# Patient Record
Sex: MALE | Race: BLACK OR AFRICAN AMERICAN | NOT HISPANIC OR LATINO | Employment: FULL TIME | URBAN - METROPOLITAN AREA
[De-identification: names, ages, dates, MRNs, and addresses within clinical notes are randomized per-mention and may not be internally consistent; named-entity substitution may affect disease eponyms.]

---

## 2023-07-27 PROBLEM — Z86.39 HISTORY OF NUTRITIONAL DISORDER: Status: ACTIVE | Noted: 2023-07-27

## 2023-07-27 PROBLEM — R63.5 WEIGHT GAIN: Status: ACTIVE | Noted: 2023-07-27

## 2023-07-27 PROBLEM — N50.89 GENITAL LESION, MALE: Status: ACTIVE | Noted: 2023-07-27

## 2023-07-27 PROBLEM — R11.0 NAUSEA: Status: ACTIVE | Noted: 2023-07-27

## 2023-07-27 PROBLEM — R42 DIZZINESS: Status: ACTIVE | Noted: 2023-07-27

## 2023-07-27 PROBLEM — B96.81 HELICOBACTER PYLORI GASTRITIS: Status: ACTIVE | Noted: 2023-07-27

## 2023-07-27 PROBLEM — K29.70 GASTRITIS: Status: ACTIVE | Noted: 2023-07-27

## 2023-07-27 PROBLEM — F41.9 ANXIETY: Status: ACTIVE | Noted: 2023-07-27

## 2023-07-27 PROBLEM — K29.70 HELICOBACTER PYLORI GASTRITIS: Status: ACTIVE | Noted: 2023-07-27

## 2023-07-27 PROBLEM — R07.89 ATYPICAL CHEST PAIN: Status: ACTIVE | Noted: 2023-07-27

## 2023-07-27 PROBLEM — B96.89 SKIN INFECTION, BACTERIAL: Status: ACTIVE | Noted: 2023-07-27

## 2023-07-27 PROBLEM — M79.18 MUSCULOSKELETAL PAIN: Status: ACTIVE | Noted: 2023-07-27

## 2023-07-27 PROBLEM — K62.5 RECTAL BLEEDING: Status: ACTIVE | Noted: 2023-07-27

## 2023-07-27 PROBLEM — R53.83 FATIGUE: Status: ACTIVE | Noted: 2023-07-27

## 2023-07-27 PROBLEM — R51.9 HEAD ACHE: Status: ACTIVE | Noted: 2023-07-27

## 2023-07-27 PROBLEM — K21.9 ESOPHAGEAL REFLUX: Status: ACTIVE | Noted: 2023-07-27

## 2023-07-27 PROBLEM — R10.9 ABDOMINAL DISCOMFORT: Status: ACTIVE | Noted: 2023-07-27

## 2023-07-27 PROBLEM — L73.9 FOLLICULITIS: Status: ACTIVE | Noted: 2023-07-27

## 2023-07-27 PROBLEM — E55.9 VITAMIN D DEFICIENCY: Status: ACTIVE | Noted: 2023-07-27

## 2023-07-27 PROBLEM — R05.9 COUGH: Status: ACTIVE | Noted: 2023-07-27

## 2023-07-27 PROBLEM — L73.1 INGROWN HAIR: Status: ACTIVE | Noted: 2023-07-27

## 2023-07-27 PROBLEM — L08.9 SKIN INFECTION, BACTERIAL: Status: ACTIVE | Noted: 2023-07-27

## 2023-07-27 PROBLEM — J06.9 ACUTE UPPER RESPIRATORY INFECTION: Status: ACTIVE | Noted: 2023-07-27

## 2023-07-27 PROBLEM — L60.8 SUBUNGUAL HEMORRHAGE: Status: ACTIVE | Noted: 2023-07-27

## 2023-07-27 RX ORDER — CLINDAMYCIN PHOSPHATE AND BENZOYL PEROXIDE 10; 50 MG/G; MG/G
GEL TOPICAL 2 TIMES DAILY
COMMUNITY
Start: 2022-07-27 | End: 2023-07-31 | Stop reason: ALTCHOICE

## 2023-07-27 RX ORDER — LORATADINE 10 MG/1
1 TABLET ORAL DAILY
COMMUNITY
Start: 2015-11-08 | End: 2023-07-31 | Stop reason: ALTCHOICE

## 2023-07-27 RX ORDER — ERGOCALCIFEROL 1.25 MG/1
CAPSULE ORAL
COMMUNITY
Start: 2020-01-22 | End: 2023-07-31 | Stop reason: ALTCHOICE

## 2023-07-27 RX ORDER — ONDANSETRON 4 MG/1
TABLET, ORALLY DISINTEGRATING ORAL
COMMUNITY
Start: 2017-11-09 | End: 2023-07-31 | Stop reason: ALTCHOICE

## 2023-07-31 ENCOUNTER — OFFICE VISIT (OUTPATIENT)
Dept: PRIMARY CARE | Facility: CLINIC | Age: 28
End: 2023-07-31
Payer: COMMERCIAL

## 2023-07-31 VITALS
WEIGHT: 257.5 LBS | BODY MASS INDEX: 38.14 KG/M2 | OXYGEN SATURATION: 97 % | DIASTOLIC BLOOD PRESSURE: 82 MMHG | HEIGHT: 69 IN | SYSTOLIC BLOOD PRESSURE: 117 MMHG | HEART RATE: 78 BPM

## 2023-07-31 DIAGNOSIS — E55.9 VITAMIN D DEFICIENCY: ICD-10-CM

## 2023-07-31 DIAGNOSIS — K21.9 GASTROESOPHAGEAL REFLUX DISEASE WITHOUT ESOPHAGITIS: ICD-10-CM

## 2023-07-31 DIAGNOSIS — E66.09 CLASS 2 OBESITY DUE TO EXCESS CALORIES WITHOUT SERIOUS COMORBIDITY WITH BODY MASS INDEX (BMI) OF 38.0 TO 38.9 IN ADULT: ICD-10-CM

## 2023-07-31 DIAGNOSIS — Z00.00 HEALTH CARE MAINTENANCE: Primary | ICD-10-CM

## 2023-07-31 PROCEDURE — 99395 PREV VISIT EST AGE 18-39: CPT

## 2023-07-31 PROCEDURE — 1036F TOBACCO NON-USER: CPT

## 2023-07-31 PROCEDURE — 3008F BODY MASS INDEX DOCD: CPT

## 2023-07-31 ASSESSMENT — ENCOUNTER SYMPTOMS
DYSPHORIC MOOD: 0
EYE DISCHARGE: 0
FREQUENCY: 0
WHEEZING: 0
CONSTITUTIONAL NEGATIVE: 1
DIAPHORESIS: 0
HEMATOLOGIC/LYMPHATIC NEGATIVE: 1
PALPITATIONS: 0
ACTIVITY CHANGE: 0
DIZZINESS: 0
RHINORRHEA: 0
MUSCULOSKELETAL NEGATIVE: 1
JOINT SWELLING: 0
POLYDIPSIA: 0
FLANK PAIN: 0
ENDOCRINE NEGATIVE: 1
TREMORS: 0
CARDIOVASCULAR NEGATIVE: 1
APPETITE CHANGE: 0
BLOOD IN STOOL: 0
MYALGIAS: 0
HEMATURIA: 0
AGITATION: 0
STRIDOR: 0
NAUSEA: 0
EYES NEGATIVE: 1
COLOR CHANGE: 0
BRUISES/BLEEDS EASILY: 0
TROUBLE SWALLOWING: 0
CONSTIPATION: 0
HEADACHES: 0
RESPIRATORY NEGATIVE: 1
COUGH: 0
LIGHT-HEADEDNESS: 0
NERVOUS/ANXIOUS: 0
POLYPHAGIA: 0
ABDOMINAL PAIN: 0
GASTROINTESTINAL NEGATIVE: 1
SINUS PRESSURE: 0
FEVER: 0
NECK STIFFNESS: 0
PHOTOPHOBIA: 0
CHEST TIGHTNESS: 0
RECTAL PAIN: 0
NECK PAIN: 0
DYSURIA: 0
APNEA: 0
BACK PAIN: 0
UNEXPECTED WEIGHT CHANGE: 0
NUMBNESS: 0
WEAKNESS: 0
SHORTNESS OF BREATH: 0
VOICE CHANGE: 0
NEUROLOGICAL NEGATIVE: 1
ANAL BLEEDING: 0
PSYCHIATRIC NEGATIVE: 1
DIFFICULTY URINATING: 0
FATIGUE: 0
SEIZURES: 0
CONFUSION: 0
SLEEP DISTURBANCE: 0
SPEECH DIFFICULTY: 0
DIARRHEA: 0
HYPERACTIVE: 0
ABDOMINAL DISTENTION: 0
CHILLS: 0
SORE THROAT: 0

## 2023-07-31 ASSESSMENT — PAIN SCALES - GENERAL: PAINLEVEL: 0-NO PAIN

## 2023-07-31 NOTE — PROGRESS NOTES
Reason for Visit: Annual Physical Exam    HPI:  Here today for CPE.    The last health maintenance visit was 1 year(s) ago. Concerns raised today include: none.  The patient's health since the last visit is described as good.  There are no interval changes in the patient's PMH, PSH, and current medications.  There are no interval changes in the patient's social and family history.  He does not have regular dental visits. Vision care includes wearing glasses and no recent eye examination. he denies hearing loss.  Immunizations status: Tdap due.  Lifestyle: reports a healthy diet. he has weight concerns. exercise 1-2 days a week. he does not use tobacco. he denies alcohol use.   Reproductive health: the patient is sexually active. He denies erectile dysfunction.   Colorectal Cancer Screening:. No indicated at this time.   Metabolic screening: lipid profile performed within the past five years and glucose screening performed 2020.      Some weight gain since last year.  Not having any nausea, diarrhea, or constipation. Bowels regular. No trouble swallowing. No chest pain or trouble breathing. Urination is OK.      EGD 2017 and also had an abdominal U/S 2017 that was normal w/normal gallbladder as well, no findings. He had an echo 2016 w/normal LV function, EF 55-60%, no valve disease.         Testicular examination: completed    Depression PHQ-2: 0        Active Problem List  Patient Active Problem List   Diagnosis    Abdominal discomfort    Acute upper respiratory infection    Anxiety    Atypical chest pain    Cough    Dizziness    Esophageal reflux    Gastritis    Fatigue    Folliculitis    Genital lesion, male    Head ache    Musculoskeletal pain    Helicobacter pylori gastritis    History of nutritional disorder    Ingrown hair    Nausea    Rectal bleeding    Skin infection, bacterial    Subungual hemorrhage    Vitamin D deficiency    Weight gain    BMI 38.0-38.9,adult       Comprehensive  Medical/Surgical/Social/Family History  Past Medical History:   Diagnosis Date    Other conditions influencing health status 02/17/2017    Healthy adult     Past Surgical History:   Procedure Laterality Date    BREAST SURGERY  04/27/2016    Breast Surgery Reduction Procedure Bilateral     Social History     Social History Narrative    Not on file         Allergies and Medications  Patient has no known allergies.  Current Outpatient Medications on File Prior to Visit   Medication Sig Dispense Refill    [DISCONTINUED] clindamycin-benzoyl peroxide (Duac) 1.2-5% gel twice a day.      [DISCONTINUED] ergocalciferol (Vitamin D-2) 1.25 MG (59309 UT) capsule Take by mouth.      [DISCONTINUED] loratadine (Claritin) 10 mg tablet Take 1 tablet (10 mg) by mouth once daily.      [DISCONTINUED] ondansetron ODT (Zofran-ODT) 4 mg disintegrating tablet Take by mouth.      [DISCONTINUED] raNITIdine (Zantac) 150 mg tablet Take by mouth twice a day.       No current facility-administered medications on file prior to visit.         ROS otherwise negative aside from what was mentioned above in HPI.  Review of Systems   Constitutional: Negative.  Negative for activity change, appetite change, chills, diaphoresis, fatigue, fever and unexpected weight change.   HENT: Negative.  Negative for congestion, dental problem, ear discharge, ear pain, hearing loss, mouth sores, nosebleeds, postnasal drip, rhinorrhea, sinus pressure, sneezing, sore throat, tinnitus, trouble swallowing and voice change.    Eyes: Negative.  Negative for photophobia, discharge and visual disturbance.   Respiratory: Negative.  Negative for apnea, cough, chest tightness, shortness of breath, wheezing and stridor.    Cardiovascular: Negative.  Negative for chest pain, palpitations and leg swelling.   Gastrointestinal: Negative.  Negative for abdominal distention, abdominal pain, anal bleeding, blood in stool, constipation, diarrhea, nausea and rectal pain.   Endocrine:  "Negative.  Negative for cold intolerance, heat intolerance, polydipsia, polyphagia and polyuria.   Genitourinary: Negative.  Negative for decreased urine volume, difficulty urinating, dysuria, flank pain, frequency, hematuria and urgency.   Musculoskeletal: Negative.  Negative for back pain, gait problem, joint swelling, myalgias, neck pain and neck stiffness.   Skin: Negative.  Negative for color change and rash.   Neurological: Negative.  Negative for dizziness, tremors, seizures, syncope, speech difficulty, weakness, light-headedness, numbness and headaches.   Hematological: Negative.  Does not bruise/bleed easily.   Psychiatric/Behavioral: Negative.  Negative for agitation, confusion, dysphoric mood, sleep disturbance and suicidal ideas. The patient is not nervous/anxious and is not hyperactive.    All other systems reviewed and are negative.        Vitals  /82   Pulse 78   Ht 1.753 m (5' 9\")   Wt 117 kg (257 lb 8 oz)   SpO2 97%   BMI 38.03 kg/m²   Body mass index is 38.03 kg/m².    Physical Exam  Physical Exam  Vitals reviewed.   Constitutional:       General: He is not in acute distress.     Appearance: Normal appearance. He is normal weight. He is not ill-appearing, toxic-appearing or diaphoretic.   HENT:      Head: Normocephalic and atraumatic.      Nose: Nose normal.   Eyes:      Extraocular Movements: Extraocular movements intact.      Conjunctiva/sclera: Conjunctivae normal.      Pupils: Pupils are equal, round, and reactive to light.   Neck:      Vascular: No carotid bruit.   Cardiovascular:      Rate and Rhythm: Normal rate and regular rhythm.      Pulses: Normal pulses.      Heart sounds: Normal heart sounds. No murmur heard.     No friction rub. No gallop.   Pulmonary:      Effort: Pulmonary effort is normal. No respiratory distress.      Breath sounds: Normal breath sounds.   Abdominal:      General: Abdomen is flat. Bowel sounds are normal.      Palpations: Abdomen is soft. "   Musculoskeletal:         General: Normal range of motion.      Cervical back: Normal range of motion and neck supple.   Lymphadenopathy:      Cervical: No cervical adenopathy.   Skin:     General: Skin is warm and dry.      Capillary Refill: Capillary refill takes less than 2 seconds.   Neurological:      General: No focal deficit present.      Mental Status: He is alert and oriented to person, place, and time. Mental status is at baseline.   Psychiatric:         Mood and Affect: Mood normal.         Behavior: Behavior normal.         Thought Content: Thought content normal.         Judgment: Judgment normal.           Assessment and Plan:  Problem List Items Addressed This Visit       Esophageal reflux    Relevant Orders    CBC and Auto Differential    Comprehensive Metabolic Panel    Hemoglobin A1C    Lipid Panel    Vitamin D 1,25 Dihydroxy    Urinalysis with Reflex Microscopic    TSH with reflex to Free T4 if abnormal    Vitamin D deficiency    Relevant Orders    CBC and Auto Differential    Comprehensive Metabolic Panel    Hemoglobin A1C    Lipid Panel    Vitamin D 1,25 Dihydroxy    Urinalysis with Reflex Microscopic    TSH with reflex to Free T4 if abnormal    BMI 38.0-38.9,adult    Relevant Orders    CBC and Auto Differential    Comprehensive Metabolic Panel    Hemoglobin A1C    Lipid Panel    Vitamin D 1,25 Dihydroxy    Urinalysis with Reflex Microscopic    TSH with reflex to Free T4 if abnormal     Other Visit Diagnoses       Health care maintenance    -  Primary    Relevant Orders    CBC and Auto Differential    Comprehensive Metabolic Panel    Hemoglobin A1C    Lipid Panel    Vitamin D 1,25 Dihydroxy    Urinalysis with Reflex Microscopic    TSH with reflex to Free T4 if abnormal    Class 2 obesity due to excess calories without serious comorbidity with body mass index (BMI) of 38.0 to 38.9 in adult        Relevant Orders    CBC and Auto Differential    Comprehensive Metabolic Panel    Hemoglobin A1C     Lipid Panel    Vitamin D 1,25 Dihydroxy    Urinalysis with Reflex Microscopic    TSH with reflex to Free T4 if abnormal              Encourage diet and exercise to maintain healthy lifestyle.     Follow up with wellness exam next year and as needed    LYNNE Hogan-CNP

## 2023-08-15 ENCOUNTER — LAB (OUTPATIENT)
Dept: LAB | Facility: LAB | Age: 28
End: 2023-08-15
Payer: COMMERCIAL

## 2023-08-15 DIAGNOSIS — Z00.00 HEALTH CARE MAINTENANCE: ICD-10-CM

## 2023-08-15 DIAGNOSIS — E66.09 CLASS 2 OBESITY DUE TO EXCESS CALORIES WITHOUT SERIOUS COMORBIDITY WITH BODY MASS INDEX (BMI) OF 38.0 TO 38.9 IN ADULT: ICD-10-CM

## 2023-08-15 DIAGNOSIS — K21.9 GASTROESOPHAGEAL REFLUX DISEASE WITHOUT ESOPHAGITIS: ICD-10-CM

## 2023-08-15 DIAGNOSIS — E55.9 VITAMIN D DEFICIENCY: ICD-10-CM

## 2023-08-15 LAB
ALANINE AMINOTRANSFERASE (SGPT) (U/L) IN SER/PLAS: 19 U/L (ref 10–52)
ALBUMIN (G/DL) IN SER/PLAS: 4.3 G/DL (ref 3.4–5)
ALKALINE PHOSPHATASE (U/L) IN SER/PLAS: 70 U/L (ref 33–120)
ANION GAP IN SER/PLAS: 15 MMOL/L (ref 10–20)
ASPARTATE AMINOTRANSFERASE (SGOT) (U/L) IN SER/PLAS: 19 U/L (ref 9–39)
BASOPHILS (10*3/UL) IN BLOOD BY AUTOMATED COUNT: 0.02 X10E9/L (ref 0–0.1)
BASOPHILS/100 LEUKOCYTES IN BLOOD BY AUTOMATED COUNT: 0.4 % (ref 0–2)
BILIRUBIN TOTAL (MG/DL) IN SER/PLAS: 0.7 MG/DL (ref 0–1.2)
CALCIUM (MG/DL) IN SER/PLAS: 9.9 MG/DL (ref 8.6–10.6)
CARBON DIOXIDE, TOTAL (MMOL/L) IN SER/PLAS: 27 MMOL/L (ref 21–32)
CHLORIDE (MMOL/L) IN SER/PLAS: 102 MMOL/L (ref 98–107)
CHOLESTEROL (MG/DL) IN SER/PLAS: 208 MG/DL (ref 0–199)
CHOLESTEROL IN HDL (MG/DL) IN SER/PLAS: 44.2 MG/DL
CHOLESTEROL/HDL RATIO: 4.7
CREATININE (MG/DL) IN SER/PLAS: 1.09 MG/DL (ref 0.5–1.3)
EOSINOPHILS (10*3/UL) IN BLOOD BY AUTOMATED COUNT: 0.12 X10E9/L (ref 0–0.7)
EOSINOPHILS/100 LEUKOCYTES IN BLOOD BY AUTOMATED COUNT: 2.6 % (ref 0–6)
ERYTHROCYTE DISTRIBUTION WIDTH (RATIO) BY AUTOMATED COUNT: 13.7 % (ref 11.5–14.5)
ERYTHROCYTE MEAN CORPUSCULAR HEMOGLOBIN CONCENTRATION (G/DL) BY AUTOMATED: 32.2 G/DL (ref 32–36)
ERYTHROCYTE MEAN CORPUSCULAR VOLUME (FL) BY AUTOMATED COUNT: 96 FL (ref 80–100)
ERYTHROCYTES (10*6/UL) IN BLOOD BY AUTOMATED COUNT: 4.47 X10E12/L (ref 4.5–5.9)
ESTIMATED AVERAGE GLUCOSE FOR HBA1C: 111 MG/DL
GFR MALE: >90 ML/MIN/1.73M2
GLUCOSE (MG/DL) IN SER/PLAS: 80 MG/DL (ref 74–99)
HEMATOCRIT (%) IN BLOOD BY AUTOMATED COUNT: 42.9 % (ref 41–52)
HEMOGLOBIN (G/DL) IN BLOOD: 13.8 G/DL (ref 13.5–17.5)
HEMOGLOBIN A1C/HEMOGLOBIN TOTAL IN BLOOD: 5.5 %
IMMATURE GRANULOCYTES/100 LEUKOCYTES IN BLOOD BY AUTOMATED COUNT: 0.2 % (ref 0–0.9)
LDL: 146 MG/DL (ref 0–99)
LEUKOCYTES (10*3/UL) IN BLOOD BY AUTOMATED COUNT: 4.6 X10E9/L (ref 4.4–11.3)
LYMPHOCYTES (10*3/UL) IN BLOOD BY AUTOMATED COUNT: 1.75 X10E9/L (ref 1.2–4.8)
LYMPHOCYTES/100 LEUKOCYTES IN BLOOD BY AUTOMATED COUNT: 38.2 % (ref 13–44)
MONOCYTES (10*3/UL) IN BLOOD BY AUTOMATED COUNT: 0.39 X10E9/L (ref 0.1–1)
MONOCYTES/100 LEUKOCYTES IN BLOOD BY AUTOMATED COUNT: 8.5 % (ref 2–10)
NEUTROPHILS (10*3/UL) IN BLOOD BY AUTOMATED COUNT: 2.29 X10E9/L (ref 1.2–7.7)
NEUTROPHILS/100 LEUKOCYTES IN BLOOD BY AUTOMATED COUNT: 50.1 % (ref 40–80)
NRBC (PER 100 WBCS) BY AUTOMATED COUNT: 0 /100 WBC (ref 0–0)
PLATELETS (10*3/UL) IN BLOOD AUTOMATED COUNT: 392 X10E9/L (ref 150–450)
POTASSIUM (MMOL/L) IN SER/PLAS: 4.5 MMOL/L (ref 3.5–5.3)
PROTEIN TOTAL: 7.6 G/DL (ref 6.4–8.2)
SODIUM (MMOL/L) IN SER/PLAS: 139 MMOL/L (ref 136–145)
THYROTROPIN (MIU/L) IN SER/PLAS BY DETECTION LIMIT <= 0.05 MIU/L: 1.68 MIU/L (ref 0.44–3.98)
TRIGLYCERIDE (MG/DL) IN SER/PLAS: 90 MG/DL (ref 0–149)
UREA NITROGEN (MG/DL) IN SER/PLAS: 8 MG/DL (ref 6–23)
VLDL: 18 MG/DL (ref 0–40)

## 2023-08-15 PROCEDURE — 82652 VIT D 1 25-DIHYDROXY: CPT

## 2023-08-15 PROCEDURE — 36415 COLL VENOUS BLD VENIPUNCTURE: CPT

## 2023-08-15 PROCEDURE — 84443 ASSAY THYROID STIM HORMONE: CPT

## 2023-08-15 PROCEDURE — 80053 COMPREHEN METABOLIC PANEL: CPT

## 2023-08-15 PROCEDURE — 85025 COMPLETE CBC W/AUTO DIFF WBC: CPT

## 2023-08-15 PROCEDURE — 83036 HEMOGLOBIN GLYCOSYLATED A1C: CPT

## 2023-08-15 PROCEDURE — 80061 LIPID PANEL: CPT

## 2023-08-16 ENCOUNTER — LAB (OUTPATIENT)
Dept: LAB | Facility: LAB | Age: 28
End: 2023-08-16
Payer: COMMERCIAL

## 2023-08-16 DIAGNOSIS — E55.9 VITAMIN D DEFICIENCY: ICD-10-CM

## 2023-08-16 DIAGNOSIS — E66.09 CLASS 2 OBESITY DUE TO EXCESS CALORIES WITHOUT SERIOUS COMORBIDITY WITH BODY MASS INDEX (BMI) OF 38.0 TO 38.9 IN ADULT: ICD-10-CM

## 2023-08-16 DIAGNOSIS — K21.9 GASTROESOPHAGEAL REFLUX DISEASE WITHOUT ESOPHAGITIS: ICD-10-CM

## 2023-08-16 DIAGNOSIS — Z00.00 HEALTH CARE MAINTENANCE: ICD-10-CM

## 2023-08-16 LAB
APPEARANCE, URINE: CLEAR
BILIRUBIN, URINE: NEGATIVE
BLOOD, URINE: NEGATIVE
COLOR, URINE: YELLOW
GLUCOSE, URINE: NEGATIVE MG/DL
KETONES, URINE: NEGATIVE MG/DL
LEUKOCYTE ESTERASE, URINE: NEGATIVE
NITRITE, URINE: NEGATIVE
PH, URINE: 6 (ref 5–8)
PROTEIN, URINE: NEGATIVE MG/DL
SPECIFIC GRAVITY, URINE: 1.01 (ref 1–1.03)
UROBILINOGEN, URINE: <2 MG/DL (ref 0–1.9)

## 2023-08-16 PROCEDURE — 81003 URINALYSIS AUTO W/O SCOPE: CPT

## 2023-08-18 LAB — VITAMIN D 1,25-DIHYDROXY: 35.6 PG/ML (ref 19.9–79.3)

## 2023-08-25 ENCOUNTER — TELEPHONE (OUTPATIENT)
Dept: PRIMARY CARE | Facility: CLINIC | Age: 28
End: 2023-08-25
Payer: COMMERCIAL

## 2023-08-25 NOTE — TELEPHONE ENCOUNTER
Overall labs looked good, normal thyroid function, blood sugars,.and liver function and kidney function. Blood counts were good. LDL elevated at 146; increase physical activity and reduce any red meats, cheese, alcohol, and sweets.

## 2024-06-10 ENCOUNTER — OFFICE VISIT (OUTPATIENT)
Dept: PRIMARY CARE | Facility: CLINIC | Age: 29
End: 2024-06-10
Payer: COMMERCIAL

## 2024-06-10 VITALS
DIASTOLIC BLOOD PRESSURE: 80 MMHG | HEART RATE: 79 BPM | SYSTOLIC BLOOD PRESSURE: 125 MMHG | RESPIRATION RATE: 21 BRPM | OXYGEN SATURATION: 99 % | BODY MASS INDEX: 38.51 KG/M2 | HEIGHT: 69 IN | WEIGHT: 260 LBS

## 2024-06-10 DIAGNOSIS — L73.9 FOLLICULITIS: Primary | ICD-10-CM

## 2024-06-10 PROCEDURE — 99213 OFFICE O/P EST LOW 20 MIN: CPT

## 2024-06-10 PROCEDURE — 3008F BODY MASS INDEX DOCD: CPT

## 2024-06-10 PROCEDURE — 1036F TOBACCO NON-USER: CPT

## 2024-06-10 RX ORDER — CLINDAMYCIN PHOSPHATE 10 MG/G
GEL TOPICAL 2 TIMES DAILY
Qty: 30 G | Refills: 0 | Status: SHIPPED | OUTPATIENT
Start: 2024-06-10 | End: 2024-06-20

## 2024-06-10 ASSESSMENT — ENCOUNTER SYMPTOMS
HEMATURIA: 0
DIFFICULTY URINATING: 0
MYALGIAS: 0
UNEXPECTED WEIGHT CHANGE: 0
CARDIOVASCULAR NEGATIVE: 1
BACK PAIN: 0
FEVER: 0
ABDOMINAL DISTENTION: 0
TROUBLE SWALLOWING: 0
BLOOD IN STOOL: 0
APPETITE CHANGE: 0
ROS SKIN COMMENTS: SEE HPI
AGITATION: 0
CHILLS: 0
SINUS PRESSURE: 0
SLEEP DISTURBANCE: 0
EYES NEGATIVE: 1
BRUISES/BLEEDS EASILY: 0
ANAL BLEEDING: 0
EYE DISCHARGE: 0
MUSCULOSKELETAL NEGATIVE: 1
DIARRHEA: 0
FATIGUE: 0
SORE THROAT: 0
COLOR CHANGE: 0
TREMORS: 0
RESPIRATORY NEGATIVE: 1
FLANK PAIN: 0
NAUSEA: 0
DIZZINESS: 0
RHINORRHEA: 0
SPEECH DIFFICULTY: 0
HYPERACTIVE: 0
CHEST TIGHTNESS: 0
GASTROINTESTINAL NEGATIVE: 1
STRIDOR: 0
JOINT SWELLING: 0
WEAKNESS: 0
NERVOUS/ANXIOUS: 0
CONFUSION: 0
WHEEZING: 0
POLYDIPSIA: 0
PHOTOPHOBIA: 0
SEIZURES: 0
POLYPHAGIA: 0
NUMBNESS: 0
NEUROLOGICAL NEGATIVE: 1
DIAPHORESIS: 0
NECK PAIN: 0
DYSURIA: 0
RECTAL PAIN: 0
ABDOMINAL PAIN: 0
LIGHT-HEADEDNESS: 0
SHORTNESS OF BREATH: 0
CONSTIPATION: 0
CONSTITUTIONAL NEGATIVE: 1
DYSPHORIC MOOD: 0
VOICE CHANGE: 0
PALPITATIONS: 0
APNEA: 0
HEADACHES: 0
FREQUENCY: 0
COUGH: 0
HEMATOLOGIC/LYMPHATIC NEGATIVE: 1
ACTIVITY CHANGE: 0
PSYCHIATRIC NEGATIVE: 1
ENDOCRINE NEGATIVE: 1
NECK STIFFNESS: 0

## 2024-06-10 NOTE — PROGRESS NOTES
Primary Care Provider: LYNNE Hogan-CNP    Subjective   Randee Allison Jr. is a 28 y.o. male who presents for Follow-up (Rash on face/Had hair bumps and discoloration comes and goes /Had acne as a adolescent ).    Occasional rash after shaving with razor blade- when hair grows back has itchiness and bumps  Has been going on for a couple months  Acne occ           Review of Systems   Constitutional: Negative.  Negative for activity change, appetite change, chills, diaphoresis, fatigue, fever and unexpected weight change.   HENT: Negative.  Negative for congestion, dental problem, ear discharge, ear pain, hearing loss, mouth sores, nosebleeds, postnasal drip, rhinorrhea, sinus pressure, sneezing, sore throat, tinnitus, trouble swallowing and voice change.    Eyes: Negative.  Negative for photophobia, discharge and visual disturbance.   Respiratory: Negative.  Negative for apnea, cough, chest tightness, shortness of breath, wheezing and stridor.    Cardiovascular: Negative.  Negative for chest pain, palpitations and leg swelling.   Gastrointestinal: Negative.  Negative for abdominal distention, abdominal pain, anal bleeding, blood in stool, constipation, diarrhea, nausea and rectal pain.   Endocrine: Negative.  Negative for cold intolerance, heat intolerance, polydipsia, polyphagia and polyuria.   Genitourinary: Negative.  Negative for decreased urine volume, difficulty urinating, dysuria, flank pain, frequency, hematuria and urgency.   Musculoskeletal: Negative.  Negative for back pain, gait problem, joint swelling, myalgias, neck pain and neck stiffness.   Skin:  Negative for color change.        SEE HPI   Neurological: Negative.  Negative for dizziness, tremors, seizures, syncope, speech difficulty, weakness, light-headedness, numbness and headaches.   Hematological: Negative.  Does not bruise/bleed easily.   Psychiatric/Behavioral: Negative.  Negative for agitation, confusion, dysphoric mood, sleep  "disturbance and suicidal ideas. The patient is not nervous/anxious and is not hyperactive.    All other systems reviewed and are negative.        Objective   /80   Pulse 79   Resp 21   Ht 1.753 m (5' 9\")   Wt 118 kg (260 lb)   SpO2 99%   BMI 38.40 kg/m²     Physical Exam  Vitals reviewed.   Constitutional:       General: He is not in acute distress.     Appearance: Normal appearance. He is normal weight. He is not ill-appearing, toxic-appearing or diaphoretic.   HENT:      Head: Normocephalic and atraumatic.      Nose: Nose normal.   Eyes:      Conjunctiva/sclera: Conjunctivae normal.   Cardiovascular:      Rate and Rhythm: Normal rate and regular rhythm.      Pulses: Normal pulses.      Heart sounds: Normal heart sounds. No murmur heard.     No friction rub. No gallop.   Pulmonary:      Effort: Pulmonary effort is normal. No respiratory distress.      Breath sounds: Normal breath sounds.   Abdominal:      General: Abdomen is flat.      Palpations: Abdomen is soft.   Musculoskeletal:         General: Normal range of motion.      Cervical back: Normal range of motion and neck supple.   Skin:     General: Skin is warm and dry.      Capillary Refill: Capillary refill takes less than 2 seconds.      Comments: Folliculitis- mild; under chin   Neurological:      General: No focal deficit present.      Mental Status: He is alert and oriented to person, place, and time. Mental status is at baseline.   Psychiatric:         Mood and Affect: Mood normal.         Behavior: Behavior normal.         Thought Content: Thought content normal.         Judgment: Judgment normal.         Assessment/Plan   Problem List Items Addressed This Visit       Folliculitis - Primary    Relevant Medications    Start clindamycin (Cleocin T) 1 % gel                Start daily washing of the affected area with an antimicrobial cleanser        Follow up with CPE in 2 months or sooner if needed  "

## 2024-07-29 ENCOUNTER — APPOINTMENT (OUTPATIENT)
Dept: PRIMARY CARE | Facility: CLINIC | Age: 29
End: 2024-07-29
Payer: COMMERCIAL

## 2024-08-05 ENCOUNTER — APPOINTMENT (OUTPATIENT)
Dept: PRIMARY CARE | Facility: CLINIC | Age: 29
End: 2024-08-05
Payer: COMMERCIAL